# Patient Record
Sex: FEMALE | Race: WHITE | Employment: FULL TIME | URBAN - METROPOLITAN AREA
[De-identification: names, ages, dates, MRNs, and addresses within clinical notes are randomized per-mention and may not be internally consistent; named-entity substitution may affect disease eponyms.]

---

## 2019-05-28 ENCOUNTER — HOSPITAL ENCOUNTER (OUTPATIENT)
Dept: MAMMOGRAPHY | Age: 44
Discharge: HOME | End: 2019-05-28
Payer: COMMERCIAL

## 2019-05-28 DIAGNOSIS — Z12.31 VISIT FOR SCREENING MAMMOGRAM: ICD-10-CM

## 2019-05-28 PROCEDURE — 77067 SCR MAMMO BI INCL CAD: CPT

## 2022-05-02 ENCOUNTER — OFFICE VISIT (OUTPATIENT)
Dept: ORTHOPEDIC SURGERY | Age: 47
End: 2022-05-02

## 2022-05-02 VITALS — WEIGHT: 165 LBS | HEIGHT: 64 IN | BODY MASS INDEX: 28.17 KG/M2

## 2022-05-02 DIAGNOSIS — M79.672 LEFT FOOT PAIN: Primary | ICD-10-CM

## 2022-05-02 DIAGNOSIS — S92.325A CLOSED NONDISPLACED FRACTURE OF SECOND METATARSAL BONE OF LEFT FOOT, INITIAL ENCOUNTER: ICD-10-CM

## 2022-05-02 PROCEDURE — L4361 PNEUMA/VAC WALK BOOT PRE OTS: HCPCS | Performed by: PHYSICIAN ASSISTANT

## 2022-05-02 PROCEDURE — E0114 CRUTCH UNDERARM PAIR NO WOOD: HCPCS | Performed by: PHYSICIAN ASSISTANT

## 2022-05-02 PROCEDURE — 99204 OFFICE O/P NEW MOD 45 MIN: CPT | Performed by: PHYSICIAN ASSISTANT

## 2022-05-02 NOTE — PROGRESS NOTES
Date:  2022    Name:  Randee Lot  Address:  [facility] [address] [address]    :  1975      Age:   50 y.o.    SSN:  [id number]      Medical Record Number:  [id number]    Reason for Visit:    Chief Complaint    Foot Pain (np/ l foot)      DOS:2022     HPI: Mich Loredo is a 50 y.o. female here today for evaluation of left foot pain that started yesterday following her half marathon. Patient states that senior living through her half marathon she started feeling pain in her foot. Following the race she started experiencing difficulty ambulating. She had difficulty sleeping and today she is having a very hard time putting any weight on her foot. Patient states that the only alleviating factor is not putting any pressure on her foot. She has taken some ibuprofen with no improvement. Denies any clicking catching locking or popping. Denies any numbness or tingling. Denies any specific injury. Pain Assessment  Location of Pain: Foot  Location Modifiers: Left  Severity of Pain: 8  Quality of Pain: Sharp,Dull  Duration of Pain: Persistent  Frequency of Pain: Constant  Aggravating Factors: Walking,Standing  Limiting Behavior: Yes  Relieving Factors: Rest,Ice,Nsaids  Result of Injury: Yes  Work-Related Injury: No  Are there other pain locations you wish to document?: No  ROS: Review of systems reviewed from Patient History Form completed today and available in the patient's chart under the Media tab.        Past Medical History:   Diagnosis Date    Endometriosis     Hx of renal calculi 2016        Past Surgical History:   Procedure Laterality Date     SECTION      x2    CHOLECYSTECTOMY         Family History   Problem Relation Age of Onset    High Blood Pressure Father     High Cholesterol Father     Cancer Maternal Grandfather        Social History     Socioeconomic History    Marital status:      Spouse name: None    Number of children: None    Years of education: None    Highest education level: None   Occupational History    None   Tobacco Use    Smoking status: Never Smoker    Smokeless tobacco: Never Used   Substance and Sexual Activity    Alcohol use: Yes     Alcohol/week: 0.0 standard drinks     Comment: occasional    Drug use: No    Sexual activity: Yes     Partners: Male   Other Topics Concern    None   Social History Narrative    Walk/jog marathons         criminal appeals, immigration[place]    Mother in law lives with her                15 yo and 10 yo                    Friend of Katlyn Lopez     Social Determinants of Health     Financial Resource Strain:     Difficulty of Paying Living Expenses: Not on file   Food Insecurity:     Worried About Running Out of Food in the Last Year: Not on file    Sade of Food in the Last Year: Not on file   Transportation Needs:     Lack of Transportation (Medical): Not on file    Lack of Transportation (Non-Medical):  Not on file   Physical Activity:     Days of Exercise per Week: Not on file    Minutes of Exercise per Session: Not on file   Stress:     Feeling of Stress : Not on file   Social Connections:     Frequency of Communication with Friends and Family: Not on file    Frequency of Social Gatherings with Friends and Family: Not on file    Attends Roman [employer] Services: Not on file    Active Member of [address] [place] [address] or Organizations: Not on file    Attends Club or Organization Meetings: Not on file    Marital Status: Not on file   Intimate Partner Violence:     Fear of Current or Ex-Partner: Not on file    Emotionally Abused: Not on file    Physically Abused: Not on file    Sexually Abused: Not on file   Housing Stability:     Unable to Pay for Housing in the Last Year: Not on file    Number of [place] in the Last Year: Not on file    Unstable Housing in the Last Year: Not on file       Current Outpatient Medications   Medication Sig Dispense Refill    ibuprofen (ADVIL;MOTRIN) 600 MG tablet Take 1 tablet by mouth every 6 hours as needed for Pain 30 tablet 0    levonorgestrel (MIRENA) 20 MCG/24HR IUD by Intrauterine route       No current facility-administered medications for this visit. No Known Allergies    Vital signs:  Ht 5' 4\" (1.626 m)   Wt 165 lb (74.8 kg)   BMI 28.32 kg/m²      Left foot examination:    Inspection: Ecchymosis of the dorsal aspect of the foot    Gait: Cannot bear weight without pain    Range of motion: Painful range of motion    Stability: No instability is present. Strength: Normal strength is present. Palpation: Focal tenderness at the base of the second metatarsal    Neurovascular: Skin is warm and well-perfused. Sensation normal.    Right foot comparison exam:    Inspection: There is normal alignment. No swelling or ecchymosis is present. Gait: Patient is able to weight-bear without pain. Range of motion: Patient can perform a toe rise without pain. There is full range of motion of the ankle, midfoot, hindfoot and forefoot. Stability: No instability is present. Strength: Normal strength is present. Palpation: There is no focal tenderness. Neurovascular: Skin is warm and well-perfused. Sensation normal.        Diagnostics:  Radiology:       Pertinent imaging was obtained, interpreted, and reviewed with the patient today, images only - no report available. Left foot x-ray:    AP, Oblique, Lateral views were obtained  and reviewed of the left foot. Impression: Fracture of the base of the second metatarsal, likely stress fracture    Office Procedures:  Orders Placed This Encounter   Procedures    XR FOOT LEFT (MIN 3 VIEWS)     Standing Status:   Future     Number of Occurrences:   1     Standing Expiration Date:   5/2/2023     Order Specific Question:   Reason for exam:     Answer:   pain    Breg Short Silva Walking Boot     Patient was prescribed a Breg Short Silva Walking Boot.   The left foot will require stabilization / immobilization from this semi-rigid / rigid orthosis to improve their function. The orthosis will assist in protecting the affected area, provide functional support and facilitate healing. Patient was instructed to progress ambulation  as non weight bearing in the device. The plan of care is to progress the patient to full weight bearing status. The patient was educated and fit by a healthcare professional with expert knowledge and specialization in brace application while under the direct supervision of the physician. Verbal and written instructions for the use of and application of this item were provided. They were instructed to contact the office immediately should the brace result in increased pain, decreased sensation, increased swelling or worsening of the condition.  Aluminum Crutches     Patient was prescribed Medline Aluminum Crutches. This mobility device is required for the following reasons:    Patient has mobility limitations that significantly impair their ability to participate in one or more mobility related activities of daily living. The patient is able to safely use the mobility device. Functional mobility deficit can be sufficiently resolved with the use of this device. Verbal and written instructions for the use of and application of this item were provided. The patient was educated and fit by a healthcare professional with expert knowledge and specialization in brace application while under the direct supervision of the treating physician. They were instructed to contact the office immediately should the equipment result in increased pain, decreased sensation, increased swelling or worsening of the condition. Assessment: 49-year-old female with left base of the second metatarsal stress fracture    Plan: Pertinent imaging was reviewed. The etiology, natural history, and treatment options for the disorder were discussed.   The roles of activity medication, antiinflammatories, injections, bracing, physical therapy, and surgical interventions were all described to the patient and questions were answered. Patient ran a half marathon yesterday and immediately after started experiencing pain. Today she has difficulty with weightbearing. On exam she is tender to palpation over the base of the second metatarsal.  On x-ray she does have a clear fracture line on the base of the second metatarsal.  At this time patient is a candidate for a boot crutches anti-inflammatories and nonweightbearing. I will see her back in 2 weeks with repeat x-rays. Estefany Ramirez is in agreement with this plan. All questions were answered to patient's satisfaction and was encouraged to call with any further questions. Total time spent for evaluation, education, and development of treatment plan: 45 minutes    Maddie Leon, [address] [place] [address]  5/2/2022    This dictation was performed with a verbal recognition program [facility]) and it was checked for errors. It is possible that there are still dictated errors within this office note. If so, please bring any areas to my attention for an addendum. All efforts were made to ensure that this office note is accurate.

## 2022-05-16 ENCOUNTER — OFFICE VISIT (OUTPATIENT)
Dept: ORTHOPEDIC SURGERY | Age: 47
End: 2022-05-16

## 2022-05-16 VITALS — HEIGHT: 64 IN | WEIGHT: 165 LBS | BODY MASS INDEX: 28.17 KG/M2

## 2022-05-16 DIAGNOSIS — M79.672 LEFT FOOT PAIN: Primary | ICD-10-CM

## 2022-05-16 DIAGNOSIS — M84.375D STRESS FRACTURE OF METATARSAL BONE OF LEFT FOOT WITH ROUTINE HEALING, SUBSEQUENT ENCOUNTER: ICD-10-CM

## 2022-05-16 PROCEDURE — 99214 OFFICE O/P EST MOD 30 MIN: CPT | Performed by: PHYSICIAN ASSISTANT

## 2022-05-16 NOTE — PROGRESS NOTES
Follow-up (left foot)      History of Present Illness:  Madi More is a 50 y.o. female here for follow-up regarding her left foot. As a review, 2 weeks ago she ran a half marathon and started noticing pain on the medial aspect of her foot. The next day she has severe difficulty with weightbearing. She was evaluated in this office and diagnosed with a stress fracture of the second metatarsal.  She was placed in a boot and instructed to remain nonweightbearing. Today patient states that she has improved significantly. She is not as tender to the touch over her foot but it still bothers her to bear weight. She has been taking her vitamin D and calcium. Medical History:  Patient's medications, allergies, past medical, surgical, social and family histories were reviewed and updated as appropriate. Pertinent items are noted in HPI  Review of systems reviewed from Patient History Form completed today and available in the patient's chart under the Media tab. Vital Signs: There were no vitals filed for this visit. Left foot examination:     Inspection: No Ecchymosis of the dorsal aspect of the foot     Gait:  Mild pain with weight-bearing     Range of motion:  Full range of motion     Stability: No instability is present.       Strength: Normal strength is present.       Palpation: Focal tenderness at the base of the second metatarsal     Neurovascular: Skin is warm and well-perfused. Sensation normal.     Right foot comparison exam:     Inspection: There is normal alignment.  No swelling or ecchymosis is present.      Gait: Patient is able to weight-bear without pain.       Range of motion: Patient can perform a toe rise without pain. There is full range of motion of the ankle, midfoot, hindfoot and forefoot.       Stability: No instability is present.       Strength: Normal strength is present.       Palpation: There is no focal tenderness.       Neurovascular: Skin is warm and well-perfused. Sensation normal.     Radiology:       Pertinent imaging was interpreted and reviewed with the patient today, images only - no report available. Left foot x-ray:     AP, Oblique, Lateral views were obtained  and reviewed of the left foot.       Impression:  Nondisplaced fracture of the base of the second metatarsal      Assessment :  50 y.o. female with left base of the second metatarsal fracture    Impression:  Encounter Diagnoses   Name Primary?  Left foot pain Yes    Stress fracture of metatarsal bone of left foot with routine healing, subsequent encounter        Office Procedures:  Orders Placed This Encounter   Procedures    XR FOOT LEFT (MIN 3 VIEWS)     Standing Status:   Future     Number of Occurrences:   1     Standing Expiration Date:   5/16/2023     Order Specific Question:   Reason for exam:     Answer:   pain           Plan: Pertinent imaging was reviewed. The etiology, natural history, and treatment options for the disorder were discussed. The roles of activity medication, antiinflammatories, injections, bracing, physical therapy, and surgical interventions were all described to the patient and questions were answered. Patient has improved significantly since her last visit. I am happy with her x-rays as there is no increase in displacement and there is some evidence of callus formation. I would like her to continue wearing her boot and remain nonweightbearing for 2 more weeks. I would like her to progress her weightbearing at that point. I will see her back in 1 month with repeat x-rays. Sooner if worsening symptoms    Orin Mercer is in agreement with this plan. All questions were answered to patient's satisfaction and was encouraged to call with any further questions.      Total time spent for evaluation, education, and development of treatment plan: 35 minutes    Cierra Givens  5/16/2022      This dictation was performed with a verbal recognition program [facility]) and it was checked for errors. It is possible that there are still dictated errors within this office note. If so, please bring any areas to my attention for an addendum. All efforts were made to ensure that this office note is accurate.

## 2022-06-13 ENCOUNTER — OFFICE VISIT (OUTPATIENT)
Dept: ORTHOPEDIC SURGERY | Age: 47
End: 2022-06-13

## 2022-06-13 VITALS — BODY MASS INDEX: 28.17 KG/M2 | HEIGHT: 64 IN | WEIGHT: 165 LBS

## 2022-06-13 DIAGNOSIS — M79.672 LEFT FOOT PAIN: Primary | ICD-10-CM

## 2022-06-13 PROCEDURE — 99214 OFFICE O/P EST MOD 30 MIN: CPT | Performed by: PHYSICIAN ASSISTANT

## 2022-06-13 NOTE — PROGRESS NOTES
Follow-up (left foot )      History of Present Illness:  Steve Beal is a 50 y.o. female here for follow-up regarding her left foot. As a review, approximately 6 weeks ago she ran a half marathon, started noticing pain in the medial aspect of her foot. She was seen at this office and diagnosed with a stress fracture of the second metatarsal.  At our last visit, she was asked to progress her weightbearing and she has been full weightbearing in a boot for 2 weeks at this point. He is doing well overall. She occasionally has some tenderness and she attempts to weight-bear without the boot    Medical History:  Patient's medications, allergies, past medical, surgical, social and family histories were reviewed and updated as appropriate. Pertinent items are noted in HPI  Review of systems reviewed from Patient History Form completed today and available in the patient's chart under the Media tab. Vital Signs: There were no vitals filed for this visit. Left foot examination:     Inspection: No Ecchymosis of the dorsal aspect of the foot     Gait:  Mild pain with weight-bearing     Range of motion:  Full range of motion     Stability: No instability is present.       Strength: Normal strength is present.       Palpation: Focal tenderness at the base of the second metatarsal     Neurovascular: Skin is warm and well-perfused. Sensation normal.     Right foot comparison exam:     Inspection: There is normal alignment.  No swelling or ecchymosis is present.      Gait: Patient is able to weight-bear without pain.       Range of motion: Patient can perform a toe rise without pain. There is full range of motion of the ankle, midfoot, hindfoot and forefoot.       Stability: No instability is present.       Strength: Normal strength is present.       Palpation: There is no focal tenderness.       Neurovascular: Skin is warm and well-perfused.  Sensation normal.    Radiology:       Pertinent imaging was interpreted and reviewed with the patient today, images only - no report available. Left ankle x-ray:    AP, lateral and mortise views were obtained  and reviewed of the left ankle. Impression: Healing nondisplaced fracture base of the second metatarsal      Assessment :  50 y.o. female with healing nondisplaced fracture of the base of the second metatarsal of the left foot    Impression:  Encounter Diagnosis   Name Primary?  Left foot pain Yes       Office Procedures:  Orders Placed This Encounter   Procedures    XR FOOT LEFT (MIN 3 VIEWS)     Standing Status:   Future     Number of Occurrences:   1     Standing Expiration Date:   6/13/2023     Order Specific Question:   Reason for exam:     Answer:   pain           Plan: Pertinent imaging was reviewed. The etiology, natural history, and treatment options for the disorder were discussed. The roles of activity medication, antiinflammatories, injections, bracing, physical therapy, and surgical interventions were all described to the patient and questions were answered. I am happy with Liudmila's x-rays today as there is ample evidence of healing. I would like her to continue weightbearing with her boot and slowly progress out of it. We discussed how she would move forward with this. I will see her back in 1 month with 1 final x-ray. Vini Decker is in agreement with this plan. All questions were answered to patient's satisfaction and was encouraged to call with any further questions. Total time spent for evaluation, education, and development of treatment plan: 35 minutes    Brandon Worthy, [address] [place] [address]  6/14/2022    This dictation was performed with a verbal recognition program ([employer]) and it was checked for errors. It is possible that there are still dictated errors within this office note. If so, please bring any areas to my attention for an addendum. All efforts were made to ensure that this office note is accurate.

## 2022-07-20 ENCOUNTER — OFFICE VISIT (OUTPATIENT)
Dept: ORTHOPEDIC SURGERY | Age: 47
End: 2022-07-20
Payer: COMMERCIAL

## 2022-07-20 VITALS — BODY MASS INDEX: 28.17 KG/M2 | TEMPERATURE: 97.5 F | WEIGHT: 165 LBS | HEIGHT: 64 IN

## 2022-07-20 DIAGNOSIS — M84.375D STRESS FRACTURE OF METATARSAL BONE OF LEFT FOOT WITH ROUTINE HEALING, SUBSEQUENT ENCOUNTER: ICD-10-CM

## 2022-07-20 DIAGNOSIS — M79.672 LEFT FOOT PAIN: Primary | ICD-10-CM

## 2022-07-20 PROCEDURE — 99214 OFFICE O/P EST MOD 30 MIN: CPT | Performed by: PHYSICIAN ASSISTANT

## 2022-07-20 PROCEDURE — MISCINSERT CARBON SHOE INSERT: Performed by: PHYSICIAN ASSISTANT
